# Patient Record
(demographics unavailable — no encounter records)

---

## 2025-01-10 NOTE — HISTORY OF PRESENT ILLNESS
[FreeTextEntry8] : 53 year old female with c/o severe pain since yesterday taking an anti-inflammatory since yesterday  still feels with movement  it is mainly when she goes to sit  very uncomfortable in different positions   spoke to her dad who's a doctor abroad  saying her dad wants her to have an xray

## 2025-01-10 NOTE — ASSESSMENT
[FreeTextEntry1] : DOESNT WANT A STEROID  continue anti-inflammatory   fu if worsening, if worsening ortho   poct urine